# Patient Record
Sex: MALE | Race: WHITE | NOT HISPANIC OR LATINO
[De-identification: names, ages, dates, MRNs, and addresses within clinical notes are randomized per-mention and may not be internally consistent; named-entity substitution may affect disease eponyms.]

---

## 2017-09-21 ENCOUNTER — OTHER (OUTPATIENT)
Age: 47
End: 2017-09-21

## 2017-09-21 ENCOUNTER — APPOINTMENT (OUTPATIENT)
Dept: COLORECTAL SURGERY | Facility: CLINIC | Age: 47
End: 2017-09-21
Payer: COMMERCIAL

## 2017-09-21 VITALS
HEIGHT: 71 IN | BODY MASS INDEX: 42 KG/M2 | SYSTOLIC BLOOD PRESSURE: 128 MMHG | WEIGHT: 300 LBS | DIASTOLIC BLOOD PRESSURE: 88 MMHG | TEMPERATURE: 98.2 F | RESPIRATION RATE: 14 BRPM

## 2017-09-21 DIAGNOSIS — Z78.9 OTHER SPECIFIED HEALTH STATUS: ICD-10-CM

## 2017-09-21 DIAGNOSIS — Z86.39 PERSONAL HISTORY OF OTHER ENDOCRINE, NUTRITIONAL AND METABOLIC DISEASE: ICD-10-CM

## 2017-09-21 DIAGNOSIS — M25.551 PAIN IN RIGHT HIP: ICD-10-CM

## 2017-09-21 DIAGNOSIS — Z82.61 FAMILY HISTORY OF ARTHRITIS: ICD-10-CM

## 2017-09-21 DIAGNOSIS — Z80.0 FAMILY HISTORY OF MALIGNANT NEOPLASM OF DIGESTIVE ORGANS: ICD-10-CM

## 2017-09-21 DIAGNOSIS — Z87.39 PERSONAL HISTORY OF OTHER DISEASES OF THE MUSCULOSKELETAL SYSTEM AND CONNECTIVE TISSUE: ICD-10-CM

## 2017-09-21 DIAGNOSIS — Z80.9 FAMILY HISTORY OF MALIGNANT NEOPLASM, UNSPECIFIED: ICD-10-CM

## 2017-09-21 DIAGNOSIS — K62.5 HEMORRHAGE OF ANUS AND RECTUM: ICD-10-CM

## 2017-09-21 PROCEDURE — 99243 OFF/OP CNSLTJ NEW/EST LOW 30: CPT | Mod: 25

## 2017-09-21 PROCEDURE — 46600 DIAGNOSTIC ANOSCOPY SPX: CPT

## 2017-09-21 RX ORDER — METFORMIN HYDROCHLORIDE 500 MG/1
500 TABLET, COATED ORAL
Refills: 0 | Status: ACTIVE | COMMUNITY

## 2017-09-21 RX ORDER — FOLIC ACID 1 MG/1
1 TABLET ORAL
Refills: 0 | Status: ACTIVE | COMMUNITY

## 2017-09-21 RX ORDER — LIRAGLUTIDE 6 MG/ML
18 INJECTION SUBCUTANEOUS
Refills: 0 | Status: ACTIVE | COMMUNITY

## 2017-10-03 ENCOUNTER — HOSPITAL ENCOUNTER (EMERGENCY)
Dept: HOSPITAL 45 - C.EDB | Age: 47
Discharge: HOME | End: 2017-10-03
Payer: COMMERCIAL

## 2017-10-03 VITALS
HEIGHT: 74.02 IN | WEIGHT: 218.7 LBS | HEIGHT: 74.02 IN | BODY MASS INDEX: 28.07 KG/M2 | BODY MASS INDEX: 28.07 KG/M2 | WEIGHT: 218.7 LBS

## 2017-10-03 VITALS — TEMPERATURE: 97.52 F

## 2017-10-03 VITALS — OXYGEN SATURATION: 98 % | HEART RATE: 63 BPM | SYSTOLIC BLOOD PRESSURE: 128 MMHG | DIASTOLIC BLOOD PRESSURE: 76 MMHG

## 2017-10-03 DIAGNOSIS — Z79.899: ICD-10-CM

## 2017-10-03 DIAGNOSIS — Z82.49: ICD-10-CM

## 2017-10-03 DIAGNOSIS — Z79.01: ICD-10-CM

## 2017-10-03 DIAGNOSIS — N13.2: Primary | ICD-10-CM

## 2017-10-03 DIAGNOSIS — Z79.82: ICD-10-CM

## 2017-10-03 DIAGNOSIS — Z80.9: ICD-10-CM

## 2017-10-03 LAB
ALBUMIN/GLOB SERPL: 1.2 {RATIO} (ref 0.9–2)
ALP SERPL-CCNC: 75 U/L (ref 45–117)
ALT SERPL-CCNC: 59 U/L (ref 12–78)
ANION GAP SERPL CALC-SCNC: 8 MMOL/L (ref 3–11)
APPEARANCE UR: (no result)
AST SERPL-CCNC: 30 U/L (ref 15–37)
BASOPHILS # BLD: 0.05 K/UL (ref 0–0.2)
BASOPHILS NFR BLD: 0.7 %
BILIRUB UR-MCNC: (no result) MG/DL
BUN SERPL-MCNC: 19 MG/DL (ref 7–18)
BUN/CREAT SERPL: 17.7 (ref 10–20)
CALCIUM SERPL-MCNC: 8.8 MG/DL (ref 8.5–10.1)
CHLORIDE SERPL-SCNC: 111 MMOL/L (ref 98–107)
CO2 SERPL-SCNC: 24 MMOL/L (ref 21–32)
COLOR UR: (no result)
COMPLETE: YES
CREAT CL PREDICTED SERPL C-G-VRATE: 102.3 ML/MIN
CREAT SERPL-MCNC: 1.1 MG/DL (ref 0.6–1.4)
EOSINOPHIL NFR BLD AUTO: 217 K/UL (ref 130–400)
GLOBULIN SER-MCNC: 3.3 GM/DL (ref 2.5–4)
GLUCOSE SERPL-MCNC: 117 MG/DL (ref 70–99)
HCT VFR BLD CALC: 43.8 % (ref 42–52)
IG%: 0.3 %
IMM GRANULOCYTES NFR BLD AUTO: 33.7 %
LYMPHOCYTES # BLD: 2.32 K/UL (ref 1.2–3.4)
MANUAL MICROSCOPIC REQUIRED?: NO
MCH RBC QN AUTO: 29.9 PG (ref 25–34)
MCHC RBC AUTO-ENTMCNC: 34.7 G/DL (ref 32–36)
MCV RBC AUTO: 86.2 FL (ref 80–100)
MONOCYTES NFR BLD: 11.5 %
NEUTROPHILS # BLD AUTO: 2 %
NEUTROPHILS NFR BLD AUTO: 51.8 %
NITRITE UR QL STRIP: (no result)
PH UR STRIP: 5 [PH] (ref 4.5–7.5)
PMV BLD AUTO: 10.1 FL (ref 7.4–10.4)
POTASSIUM SERPL-SCNC: 3.8 MMOL/L (ref 3.5–5.1)
RBC # BLD AUTO: 5.08 M/UL (ref 4.7–6.1)
REVIEW REQ?: NO
SODIUM SERPL-SCNC: 143 MMOL/L (ref 136–145)
SP GR UR STRIP: 1.03 (ref 1–1.03)
URINE BILL WITH OR WITHOUT MIC: (no result)
UROBILINOGEN UR-MCNC: (no result) MG/DL
WBC # BLD AUTO: 6.88 K/UL (ref 4.8–10.8)
ZZUR CULT IF INDIC CLEAN CATCH: NO

## 2017-10-03 NOTE — EMERGENCY ROOM VISIT NOTE
History


First contact with patient:  02:28


Chief Complaint:  KIDNEY STONE


Stated Complaint:  KIDNEY STONE





History of Present Illness


The patient is a 47 year old male who presents to the Emergency Room with 

complaints of right flank pain times one hour.  The patient states his pain 

started suddenly.  The pain radiates from the right flank into the groin.  He 

states that the pain is a constant 5/10, and intermittently worsens to become a 

10/10.  He has associated nausea without vomiting.  He denies urinary symptoms.

  The patient does report a history of kidney stones but has not had one in 

several years.





Review of Systems


A complete 10 point review of systems was reviewed with the patient with 

pertinent positives and negatives as per history of present illness. All else 

were negative.





Past Medical/Surgical History


Medical Problems:


(1) CALCULUS OF KIDNEY








Family History





Cancer


Heart disease


Hypertension





Social History


Smoking Status:  Never Smoker


Alcohol Use:  none


Marital Status:  


Housing Status:  lives with significant other


Occupation Status:  employed





Current/Historical Medications


Scheduled


Aspirin (Aspirin Ec), 81 MG PO DAILY


Atorvastatin (Lipitor), 80 MG PO DAILY


Clopidogrel (Plavix), 75 MG PO DAILY


Metoprolol Tartrate (Lopressor) (Lopressor), 12.5 MG PO BID


Ondasetron Odt (Zofran Odt), 4 MG SL Q6H


Tamsulosin Hcl (Flomax), 0.4 MG PO DAILY





Scheduled PRN


Oxycodone/Acetaminophen 5MG/325MG (Percocet 5MG/325MG), 1-2 TABS PO Q4H PRN for 

Pain





Physical Exam


Vital Signs











  Date Time  Temp Pulse Resp B/P (MAP) Pulse Ox O2 Delivery O2 Flow Rate FiO2


 


10/3/17 04:33  63 18 128/76 98   


 


10/3/17 02:24 36.4 58 18 153/99 98 Room Air  











Physical Exam


VITALS: Vitals are noted on the nurse's note and reviewed by myself.  Vital 

signs stable.


GENERAL: This is a 47-year-old male, in no acute distress, nondiaphoretic, well-

developed well-nourished.


SKIN: No rashes.


HEART: Regular rate and rhythm without murmurs gallops or rubs.


LUNGS: Clear to auscultation bilaterally without wheezes, rales or rhonchi. 


ABDOMEN: Positive bowel sounds x 4.  Soft, mild tenderness in the right lower 

quadrant.  No CVA tenderness.


NEURO: Patient was alert and oriented to person place and time.





Medical Decision & Procedures


ER Provider


Diagnostic Interpretation:


CT ABDOMEN & PELVIS WITHOUT CONTRAST:





3 mm calculus seen within distal right ureter, which causes minimal 

hydroureteronephrosis.


There are additional nonobstructing calculi within both kidneys.





Additional findings:


Lower thorax is unremarkable.


There are couple low-density lesions within the liver, the largest in the right 

hepatic lobe measuring 5.7 cm.  These are grossly unchanged compared to the 

prior.  Again these are of indeterminate etiology.


Gallbladder, spleen, pancreas and adrenal glands are unremarkable.


Appendix is unremarkable.


Bowel is unremarkable.


Again seen is a full masslike lesion occupying and expanding the left neural 

foramen at L2-3.  Again findings may represent but are not limited to a 

perineural cyst versus neurofibroma.


No acute osseous abnormality.





Radiologist:  Checo Sheldon MD





Laboratory Results


10/3/17 02:50








Red Blood Count 5.08, Mean Corpuscular Volume 86.2, Mean Corpuscular Hemoglobin 

29.9, Mean Corpuscular Hemoglobin Concent 34.7, Mean Platelet Volume 10.1, 

Neutrophils (%) (Auto) 51.8, Lymphocytes (%) (Auto) 33.7, Monocytes (%) (Auto) 

11.5, Eosinophils (%) (Auto) 2.0, Basophils (%) (Auto) 0.7, Neutrophils # (Auto

) 3.56, Lymphocytes # (Auto) 2.32, Monocytes # (Auto) 0.79, Eosinophils # (Auto

) 0.14, Basophils # (Auto) 0.05





10/3/17 02:50

















Test


  10/3/17


02:45 10/3/17


02:50


 


Urine Color DK YELLOW  


 


Urine Appearance CLOUDY (CLEAR)  


 


Urine pH 5.0 (4.5-7.5)  


 


Urine Specific Gravity


  1.030


(1.000-1.030) 


 


 


Urine Protein NEG (NEG)  


 


Urine Glucose (UA) NEG (NEG)  


 


Urine Ketones TRACE (NEG)  


 


Urine Occult Blood 3+ (NEG)  


 


Urine Nitrite NEG (NEG)  


 


Urine Bilirubin NEG (NEG)  


 


Urine Urobilinogen NEG (NEG)  


 


Urine Leukocyte Esterase NEG (NEG)  


 


Urine WBC (Auto) 1-5 /hpf (0-5)  


 


Urine RBC (Auto) >30 /hpf (0-4)  


 


Urine Hyaline Casts (Auto) 1-5 /lpf (0-5)  


 


Urine Epithelial Cells (Auto)


  10-20 /lpf


(0-5) 


 


 


Urine Bacteria (Auto) NEG (NEG)  


 


White Blood Count


  


  6.88 K/uL


(4.8-10.8)


 


Red Blood Count


  


  5.08 M/uL


(4.7-6.1)


 


Hemoglobin


  


  15.2 g/dL


(14.0-18.0)


 


Hematocrit  43.8 % (42-52) 


 


Mean Corpuscular Volume


  


  86.2 fL


()


 


Mean Corpuscular Hemoglobin


  


  29.9 pg


(25-34)


 


Mean Corpuscular Hemoglobin


Concent 


  34.7 g/dl


(32-36)


 


Platelet Count


  


  217 K/uL


(130-400)


 


Mean Platelet Volume


  


  10.1 fL


(7.4-10.4)


 


Neutrophils (%) (Auto)  51.8 % 


 


Lymphocytes (%) (Auto)  33.7 % 


 


Monocytes (%) (Auto)  11.5 % 


 


Eosinophils (%) (Auto)  2.0 % 


 


Basophils (%) (Auto)  0.7 % 


 


Neutrophils # (Auto)


  


  3.56 K/uL


(1.4-6.5)


 


Lymphocytes # (Auto)


  


  2.32 K/uL


(1.2-3.4)


 


Monocytes # (Auto)


  


  0.79 K/uL


(0.11-0.59)


 


Eosinophils # (Auto)


  


  0.14 K/uL


(0-0.5)


 


Basophils # (Auto)


  


  0.05 K/uL


(0-0.2)


 


RDW Standard Deviation


  


  39.3 fL


(36.4-46.3)


 


RDW Coefficient of Variation


  


  12.3 %


(11.5-14.5)


 


Immature Granulocyte % (Auto)  0.3 % 


 


Immature Granulocyte # (Auto)


  


  0.02 K/uL


(0.00-0.02)


 


Anion Gap


  


  8.0 mmol/L


(3-11)


 


Est Creatinine Clear Calc


Drug Dose 


  102.3 ml/min 


 


 


Estimated GFR (


American) 


  90.9 


 


 


Estimated GFR (Non-


American 


  78.4 


 


 


BUN/Creatinine Ratio  17.7 (10-20) 


 


Calcium Level


  


  8.8 mg/dl


(8.5-10.1)


 


Total Bilirubin


  


  0.6 mg/dl


(0.2-1)


 


Aspartate Amino Transf


(AST/SGOT) 


  30 U/L (15-37) 


 


 


Alanine Aminotransferase


(ALT/SGPT) 


  59 U/L (12-78) 


 


 


Alkaline Phosphatase


  


  75 U/L


()


 


Total Protein


  


  7.2 gm/dl


(6.4-8.2)


 


Albumin


  


  3.9 gm/dl


(3.4-5.0)


 


Globulin


  


  3.3 gm/dl


(2.5-4.0)


 


Albumin/Globulin Ratio  1.2 (0.9-2) 


 


Lipase


  


  153 U/L


()











Medications Administered











 Medications


  (Trade)  Dose


 Ordered  Sig/Prince


 Route  Start Time


 Stop Time Status Last Admin


Dose Admin


 


 Sodium Chloride  1,000 ml @ 


 999 mls/hr  Q1H1M STAT


 IV  10/3/17 02:46


 10/3/17 03:46 DC 10/3/17 02:55


999 MLS/HR


 


 Ondansetron HCl


  (Zofran Inj)  4 mg  NOW  STAT


 IV  10/3/17 02:46


 10/3/17 02:47 DC 10/3/17 02:55


4 MG


 


 Ketorolac


 Tromethamine


  (Toradol Inj)  30 mg  NOW  STAT


 IV  10/3/17 02:46


 10/3/17 02:47 DC 10/3/17 02:57


30 MG


 


 Oxycodone/


 Acetaminophen


  (Percocet 5/


 325MG Home Pack)  1 homepack  UD  ONCE


 PO  10/3/17 04:15


 10/3/17 04:16 DC 10/3/17 04:28


1 HOMEPACK


 


 Ondansetron HCl


  (ZOFRAN ODT 4MG


 Home Pack)  1 homepack  UD  ONCE


 PO  10/3/17 04:15


 10/3/17 04:16 DC 10/3/17 04:28


1 HOMEPACK


 


 Tamsulosin HCl


  (Flomax Cap)  0.4 mg  NOW  ONCE


 PO  10/3/17 04:15


 10/3/17 04:16 DC 10/3/17 04:28


0.4 MG











ED Course


The patient was evaluated as above.  Labs were drawn and IV access was 

obtained.  





Patient was medicated with 1 L normal saline solution, 30 mg Toradol IV and 4 

mg Zofran IV.





CT of the abdomen and pelvis was performed and read by radiology as above. 





Patient was reevaluated and stated he felt much better.





Discharge instructions were reviewed with the patient.  The patient verbalized 

understanding of my assessment and treatment plan and was discharged home in 

good condition.





Medical Decision


Differential diagnosis includes kidney stone, pyelonephritis, gastroenteritis, 

colitis, pancreatitis, cholecystitis, among others.





The patient is a 47-year-old male who presents today complaining of right flank 

pain.  Labs were unremarkable.  Urinalysis was not suggestive of infection.  CT 

shows a 3 mm obstructing kidney stone.  Patient will be treated with pain and 

nausea medications at home.  He was given a urine strainer.  He was encouraged 

to return here for any worsening or new/concerning symptoms.





Based on the patient's presentation and work up, I feel the patient is stable 

for outpatient treatment.  The patient was educated to return to the emergency 

department for any worsening of their current condition or new/concerning 

symptoms.  He will follow up with his PCP/urology as needed.





Medication Reconcilliation


Current Medication List:  was personally reviewed by me





Blood Pressure Screening


Patient's blood pressure:  Normal blood pressure





Impression





 Primary Impression:  


 Ureteral calculus





Departure Information


Dispostion


Home / Self-Care





Condition


GOOD





Prescriptions





Tamsulosin Hcl (FLOMAX) 0.4 Mg Cap


0.4 MG PO DAILY for 10 Days, #10 CAP


   Prov: Kayla Palomo PA-C         10/3/17 


Ondasetron Odt (ZOFRAN ODT) 4 Mg Tab


4 MG SL Q6H for Nausea, #15 TAB


   Prov: Kayla Palomo PA-C         10/3/17 


Oxycodone/Acetaminophen 5MG/325MG (PERCOCET 5MG/325MG)  Tab


1-2 TABS PO Q4H Y for Pain, #24 TAB


   For Initial Treatment


   Prov: Kayla Palomo PA-C         10/3/17





Referrals


Vamshi Ortiz M.D. (PCP)





Patient Instructions


My [facility]





Additional Instructions





You have been treated in the Emergency Department today for a Kidney Stone (

Nephrolithiasis). 





You have been prescribed Percocet to be used for pain control. This is a 

narcotic medication. You cannot drive or consume alcohol while on this 

medicine. This medicine should only be used for pain that cannot be controlled 

with over-the-counter pain medicines.





You have been prescribed Zofran to be used for any nausea or vomiting. Take as 

prescribed.





You have been prescribed Flomax 0.4 mg to be taken ONCE daily. This medicine 

has been prescribed as it can help relax the smooth muscles of the urinary 

tract increasing transit time of the kidney stone.





For pain control, you can use the following over-the-counter medicines (if >13 yo):





- Regular strength (325mg/tab) Tylenol (acetaminophen) 2 tabs every 4-6 hours 

as needed. Do not exceed 12 tablets in a 24 hour period. Avoid taking more than 

4 grams (4000 mg) of Tylenol per day. This includes any other sources of 

acetaminophen you may take on a regular basis.





- Regular strength (200 mg/tab) Advil (ibuprofen) 1-2 tabs every 4-6 hours as 

needed. Do not exceed a dose of 3200 mg per day.





You have been provided a strainer and specimen collection cup. You should 

strain your urine to collect any passed stones. Your stones can be placed into 

the specimen cup and taken to your Urologist for further evaluation.





Follow up with your primary care provider this week.





Return to the Emergency Department if your symptoms persist despite the 

treatment plan outlined above or if you develop the following symptoms: 

intractable pain, fever, chills, or large amounts of blood in your urine.

## 2017-10-03 NOTE — DIAGNOSTIC IMAGING REPORT
ABD/PELVIS WITHOUT FOR STONE



HISTORY:  47 years-old Male right flank pain, hx kidney stones acute right-sided

flank pain with history of kidney stones.



COMPARISON: CT abdomen and pelvis 5/9/2011, MRI lumbar spine 11/9/2011 and

7/6/2013, PET CT 11/30/2011



TECHNIQUE: Multiple axial CT images of the abdomen and pelvis were obtained

without IV contrast. A dose lowering technique was used consistent with the

principals of MAURO.



FINDINGS: 

There is minimal dependent bibasilar atelectasis. No pneumoperitoneum. Imaged

inferior cardiac chambers are unremarkable.



There is redemonstration of multiple hepatic lesions, largest of which is

loculated with peripherally slightly increased attenuation and central

low-attenuation, 4.4 x 5.4 cm which previously measured 5.1 x 4.0 cm on study

dated 5/9/2011. 2.1 x 1.8 cm posterior right hepatic lobe lesion seen on image

32 of series 2 previously measured 1.3 x 0.9 cm on study dated 5/9/2011. The

largest lesion within the left hepatic lobe measures 1.8 x 1.4 cm, also

unchanged. Gallbladder is mildly contracted. Spleen, pancreas and adrenal glands

are within normal limits.



3 mm nonobstructing calculus is seen within the inferior pole left kidney. There

are several nonobstructing right renal calculi present, largest of which

measures 3 mm within the inferior pole. There is mild right-sided

hydroureteronephrosis with surrounding inflammatory stranding secondary to a 4 x

3 x 4 mm calculus of the distal right ureter which is present approximately 1.5

cm proximal to the ureterovesicular junction. Urinary bladder is partially

collapsed. Prostate is unremarkable. The abdominal aorta is normal in course and

caliber.



There is no bowel obstruction or focal bowel wall thickening. Rectosigmoid is

collapsed. The appendix is normal.



Ovoid slightly low attenuating circumscribed structure of the left L2-L3

neuroforamen is again seen which has slightly decreased in size from comparison

CT now measuring 4.0 x 2.2 x 4.7 cm in transverse, AP and craniocaudal

dimensions. This again causes expansion and smooth remodeling of the left

neuroforamen. Unchanged lytic 2.3 x 1.5 cm lesion involves the left lower

sternal body, also unchanged. This again suggests a benign lesion as seen on

comparison studies.



The bones appear otherwise intact and unremarkable.



IMPRESSION: 

1. Mild right-sided hydroureteronephrosis secondary to a 4 x 3 x 4 mm calculus

of the distal right ureter which is present approximately 1.5 cm proximal to the

ureterovesicular junction. Bilateral nonobstructing renal calculi are present as

above.

2. Several mildly complex indeterminate hepatic lesions are again noted. The

lesions appear to be slightly increased in size from comparison study dated

5/9/2011.

3. Mildly decreased size of expansile ovoid circumscribed mass centered within

the left L2-L3 neuroforamen, again suggesting neurofibroma or schwannoma.

4. Unchanged lytic lesion of the lower left sternal body which did not

demonstrate FDG uptake on comparison PET CT suggesting benign etiology.



The above report was generated using voice recognition software. It may contain

grammatical, syntax or spelling errors.







Electronically signed by:  Jericho Perdomo M.D.

10/3/2017 7:13 AM



Dictated Date/Time:  10/3/2017 6:57 AM

## 2017-10-10 ENCOUNTER — RESULT REVIEW (OUTPATIENT)
Age: 47
End: 2017-10-10

## 2017-10-10 ENCOUNTER — APPOINTMENT (OUTPATIENT)
Dept: COLORECTAL SURGERY | Facility: CLINIC | Age: 47
End: 2017-10-10
Payer: COMMERCIAL

## 2017-10-10 ENCOUNTER — APPOINTMENT (OUTPATIENT)
Dept: COLORECTAL SURGERY | Facility: CLINIC | Age: 47
End: 2017-10-10

## 2017-10-10 PROCEDURE — 45378 DIAGNOSTIC COLONOSCOPY: CPT

## 2018-03-13 ENCOUNTER — HOSPITAL ENCOUNTER (OUTPATIENT)
Dept: HOSPITAL 45 - C.LAB | Age: 48
Discharge: HOME | End: 2018-03-13
Attending: INTERNAL MEDICINE
Payer: COMMERCIAL

## 2018-03-13 DIAGNOSIS — E78.5: Primary | ICD-10-CM

## 2018-03-13 LAB
ALT SERPL-CCNC: 39 U/L (ref 12–78)
AST SERPL-CCNC: 27 U/L (ref 15–37)
KETONES UR QL STRIP: 39 MG/DL
PH UR: 104 MG/DL (ref 0–200)

## 2018-07-26 PROBLEM — Z80.0 FAMILY HISTORY OF COLON CANCER: Status: ACTIVE | Noted: 2017-09-21

## 2019-02-12 ENCOUNTER — APPOINTMENT (OUTPATIENT)
Dept: COLORECTAL SURGERY | Facility: CLINIC | Age: 49
End: 2019-02-12
Payer: COMMERCIAL

## 2019-02-12 VITALS
HEART RATE: 102 BPM | SYSTOLIC BLOOD PRESSURE: 150 MMHG | WEIGHT: 295 LBS | HEIGHT: 71 IN | DIASTOLIC BLOOD PRESSURE: 98 MMHG | RESPIRATION RATE: 14 BRPM | BODY MASS INDEX: 41.3 KG/M2

## 2019-02-12 DIAGNOSIS — K64.9 UNSPECIFIED HEMORRHOIDS: ICD-10-CM

## 2019-02-12 PROCEDURE — 99214 OFFICE O/P EST MOD 30 MIN: CPT | Mod: 25

## 2019-02-12 PROCEDURE — 46600 DIAGNOSTIC ANOSCOPY SPX: CPT

## 2019-02-12 NOTE — PHYSICAL EXAM
[Excoriation] : excoriations [Normal] : was normal [None] : there was no rectal mass  [Respiratory Effort] : normal respiratory effort [Calm] : calm [de-identified] : grade 2 internal hemorrhoids [de-identified] : thrombosed right external hemorrhoid [de-identified] : Well appearing, in no distress [de-identified] : normocephalic, atraumatic [de-identified] : moves extremities without difficulty [de-identified] : warm and dry [de-identified] : alert and oriented x 3

## 2019-02-12 NOTE — HISTORY OF PRESENT ILLNESS
[FreeTextEntry1] : 48 year old male with a history of hemorrhoids who presents for new symptoms of hemorrhoids. He reports a 1 week history of discomfort in the anal region similar to prior episode of thrombosed hemorrhoid. No rectal bleeding or changes in bowel movements although has loose stools from metformin. Last colonoscopy was in 2017 and had a polyp.